# Patient Record
Sex: MALE | Race: WHITE | NOT HISPANIC OR LATINO
[De-identification: names, ages, dates, MRNs, and addresses within clinical notes are randomized per-mention and may not be internally consistent; named-entity substitution may affect disease eponyms.]

---

## 2020-07-30 PROBLEM — Z00.00 ENCOUNTER FOR PREVENTIVE HEALTH EXAMINATION: Status: ACTIVE | Noted: 2020-07-30

## 2022-08-09 ENCOUNTER — APPOINTMENT (OUTPATIENT)
Dept: ORTHOPEDIC SURGERY | Facility: CLINIC | Age: 57
End: 2022-08-09

## 2022-08-09 PROCEDURE — 73560 X-RAY EXAM OF KNEE 1 OR 2: CPT | Mod: 50

## 2022-08-09 PROCEDURE — 99213 OFFICE O/P EST LOW 20 MIN: CPT

## 2022-08-09 RX ORDER — MELOXICAM 7.5 MG/1
7.5 TABLET ORAL
Qty: 60 | Refills: 0 | Status: ACTIVE | COMMUNITY
Start: 2022-04-22

## 2022-08-09 RX ORDER — FAMOTIDINE 40 MG/1
40 TABLET, FILM COATED ORAL
Qty: 90 | Refills: 0 | Status: ACTIVE | COMMUNITY
Start: 2022-07-20

## 2022-08-09 RX ORDER — ALPRAZOLAM 1 MG/1
1 TABLET ORAL
Qty: 60 | Refills: 0 | Status: ACTIVE | COMMUNITY
Start: 2022-07-26

## 2022-08-09 RX ORDER — ZOLPIDEM TARTRATE 10 MG/1
10 TABLET ORAL
Qty: 30 | Refills: 0 | Status: ACTIVE | COMMUNITY
Start: 2022-06-14

## 2022-08-09 NOTE — HISTORY OF PRESENT ILLNESS
[de-identified] : History:\par f/u of b/l knee oa\par r worse than left\par prior injections did not help\par taking meloxicam\par \par Pain activity related, localized to the joint, partially improved with rest.\par ADL's noted to be increasingly difficult without use of modifying therapy.\par \par \par Medical History: Past Medical History is unchanged, all medical issues and medications are stable.\par \par Review of systems is negative for fevers, chills, chest pain, shortness of breath, unexplained weight fluctuations, GI or  symptoms.\par \par Smoking history and social habits are unchanged.\par \par \par \par Exam:\par b/l knee jlt, crepititioan, farom no contractures\par \par \par Imaging:\par Imaging:\par X-rays were taken in the office today.  \par AP and Lateral views were obtained of the knees.\par X-rays are negative for acute bone or soft tissue trauma.\par moderate OA noted b/l  right more severe than left\par \par Impression: b/l knee oa\par Plan is for non op management to include rest, activity modification, therapeutic exercises, stretching program, use of nsaids and analgesics, and weight control.\par \par \par Plan:\par

## 2022-08-24 ENCOUNTER — APPOINTMENT (OUTPATIENT)
Dept: PAIN MANAGEMENT | Facility: CLINIC | Age: 57
End: 2022-08-24

## 2022-08-24 VITALS — WEIGHT: 225 LBS | BODY MASS INDEX: 35.31 KG/M2 | HEIGHT: 67 IN

## 2022-08-24 LAB — BARBITURATES UR-MCNC: NORMAL

## 2022-08-24 PROCEDURE — 80305 DRUG TEST PRSMV DIR OPT OBS: CPT | Mod: QW

## 2022-08-24 PROCEDURE — 99213 OFFICE O/P EST LOW 20 MIN: CPT

## 2022-08-24 NOTE — PHYSICAL EXAM
[Normal Mood and Affect] : normal mood and affect [Orientated] : orientated [Able to Communicate] : able to communicate [Well Developed] : well developed [Well Nourished] : well nourished [] : diminished ROM in all planes [Flexion] : flexion

## 2022-08-24 NOTE — HISTORY OF PRESENT ILLNESS
[FreeTextEntry1] : HISTORY OF PRESENT ILLNESS, ORIGINAL PRESENTATION : This is a 56 year old man complaining of lower back pain. Patient underwent a triple fusion, it helped for the 1st year and then his pain came back. He started exercising however he made his pain worse. The patient has had this pain for 6 years. The pain started after injury at work, however that case has since closed. Patient describes pain as severe. During the last month the pain has been in no typical pattern. Pain described as sharp, pressure-like, shooting. Pain is associated with numbness and pins and needles into the lower extremity. Pain is increased with standing, walking, exercise, coughing/sneezing. Pain is decreased with lying down, relaxation. Pain is not changed with sitting, bowel movements. Bowel or bladder habits have not changed. \par \par PRIOR PAIN TREATMENTS: Moderate relief from surgery. No relief from injections, physical therapy, exercise, TENS unit, heat/cold treatment\par \par TODAY: I had the pleasure of speaking with Mr. Goode today in follow up. His previous history and physical findings have been reviewed.\par \par He is under our care for chronic lumbar pain with radiculopathy which he is receiving continuing active treatment for. He continues to remain remains stable on a regimen of Mobic 7.5 bid, gabapentin 400 mg, and Percocet 10/325 mg TID prn pain which provides him with 50% relief. He states the current regimen allows him to function and perform ADLs which he is pleased with. He wishes to continue as is without change and we are agreeable as well. He will undergo updated UDS testing today .

## 2022-08-24 NOTE — ASSESSMENT
[FreeTextEntry1] : 56 year old male with lumbar pain with radiculopathy. We will continue to prescribe Percocet, mobic and gabapentin as mentioned and he will f/u in 4 weeks for re evaluation. He is aware if there are any issues he can contact the office.\par \par I personally reviewed with the PA, this patient's history and physical exam findings, as documented above. I have discussed the relevant areas of concern, having direct implications to the presenting problems and illnesses, and I have personally examined all pertinent and positive and negative findings, which impact on the prior pain management treatment. \par \par \par Check of the  registry reveals compliance in regards to narcotic medication management use\par \par \par Urine drug screening collected today with rapid sample result consistent with given regimen. Sample to be sent for confirmatory testing.\par \par Varsha Otero, MS, PA-C\par Nehemiah Danielson, \par \par

## 2022-08-24 NOTE — REASON FOR VISIT
[Follow-Up Visit] : a follow-up pain management visit [FreeTextEntry2] : FOLLOW UP FOR CHRONIC LUMBAR PAIN WITH RADICULOPATHY

## 2022-10-20 ENCOUNTER — APPOINTMENT (OUTPATIENT)
Dept: PAIN MANAGEMENT | Facility: CLINIC | Age: 57
End: 2022-10-20

## 2022-10-20 VITALS
BODY MASS INDEX: 35.31 KG/M2 | WEIGHT: 225 LBS | HEIGHT: 67 IN | DIASTOLIC BLOOD PRESSURE: 75 MMHG | HEART RATE: 89 BPM | SYSTOLIC BLOOD PRESSURE: 138 MMHG

## 2022-10-20 PROCEDURE — 99213 OFFICE O/P EST LOW 20 MIN: CPT

## 2022-10-20 NOTE — ASSESSMENT
[FreeTextEntry1] : 56 year old male with lumbar pain with radiculopathy. We will continue to prescribe Percocet, mobic and gabapentin as mentioned and he will f/u in 4-8 weeks for reevaluation. He is aware if there are any issues he can contact the office.\par

## 2022-10-20 NOTE — DISCUSSION/SUMMARY
[de-identified] : I have consulted the  registry for the purpose of reviewing the patient's controlled substance.\par \par Overall there is at least a 30-50% reduction in pain with the prescribed analgesics. The patient denies any adverse side effects due to the medication (sleeping disturbance, constipation, sleepiness, hallucinations and/or urination problems). \par \par There are no inconsistencies on urine toxicology screening which would necessitate an alteration of therapy.\par \par The patient will return to the office in 4-8 weeks time and is aware to contact me with any question or concerns in the interim.\par \par Thelma Reid PA-C\par Nehemiah Danielson, \par

## 2022-10-20 NOTE — HISTORY OF PRESENT ILLNESS
[FreeTextEntry1] : HISTORY OF PRESENT ILLNESS, ORIGINAL PRESENTATION: This is a 56 year old man complaining of lower back pain. Patient underwent a triple fusion, it helped for the 1st year and then his pain came back. He started exercising however he made his pain worse. The patient has had this pain for 6 years. The pain started after injury at work, however that case has since closed. Patient describes pain as severe. During the last month the pain has been in no typical pattern. Pain described as sharp, pressure-like, shooting. Pain is associated with numbness and pins and needles into the lower extremity. Pain is increased with standing, walking, exercise, coughing/sneezing. Pain is decreased with lying down, relaxation. Pain is not changed with sitting, bowel movements. Bowel or bladder habits have not changed. \par \par PRIOR PAIN TREATMENTS: Moderate relief from surgery. No relief from injections, physical therapy, exercise, TENS unit, heat/cold treatment\par \par TODAY: He presents for a revisit appointment. He is under our care for chronic lumbar pain with radiculopathy. He continues to remain stable on a regimen of Mobic, gabapentin 400 mg, and Percocet 10/325 mg TID prn pain which provides him with 50% relief. He states the current regimen allows him to function and perform ADLs which he is pleased with. He wishes to continue as is without change and we are agreeable as well. UDS from August is consistent.

## 2022-11-01 ENCOUNTER — APPOINTMENT (OUTPATIENT)
Dept: ORTHOPEDIC SURGERY | Facility: CLINIC | Age: 57
End: 2022-11-01

## 2022-11-01 VITALS — WEIGHT: 232 LBS | BODY MASS INDEX: 36.41 KG/M2 | HEIGHT: 67 IN

## 2022-11-08 ENCOUNTER — APPOINTMENT (OUTPATIENT)
Dept: ORTHOPEDIC SURGERY | Facility: CLINIC | Age: 57
End: 2022-11-08

## 2022-11-08 PROCEDURE — 73502 X-RAY EXAM HIP UNI 2-3 VIEWS: CPT

## 2022-11-08 PROCEDURE — 99214 OFFICE O/P EST MOD 30 MIN: CPT

## 2022-11-08 NOTE — HISTORY OF PRESENT ILLNESS
[de-identified] : f/u of hips and knees\par right knee pain secondary to OA\par also right hip trochanteric bursitis\par \par has lost some wieght \par plan to move forward with a tka\par \par We discussed the surgery, including a description of the surgery in layman's terms, preoperative evaluation, the hospital stay, anesthesia, and expected outcome.  \par Models equivalent to the actual knee replacement prosthesis were used to demonstrate the magnitude and scope of the surgery.  Various modes of implant fixation including cement and biologic fixation were described.  The patient shared in the decision making and agreed to the use of implants.\par \par Additionally surgical risks were discussed. The patient has a good understanding of the inherent risks of surgery which include bleeding, pain, and infection, blood clots, and any medical issues that may arise in the perioperative period.  Additionally, we discussed risks uniquely pertinent to knee replacement surgery, including arthrofibrosis, instability, loosening, numbness around the incision, nerve injury, and possible need for revision surgery should the replacement not function optimally.  All questions were answered and the patient verbalized understanding.  I encouraged the patient to contact me should any further questions or issues arise.\par \par f/u at time of surgery

## 2022-11-15 ENCOUNTER — RX RENEWAL (OUTPATIENT)
Age: 57
End: 2022-11-15

## 2022-12-14 ENCOUNTER — RX RENEWAL (OUTPATIENT)
Age: 57
End: 2022-12-14

## 2022-12-16 ENCOUNTER — RX RENEWAL (OUTPATIENT)
Age: 57
End: 2022-12-16

## 2022-12-19 ENCOUNTER — LABORATORY RESULT (OUTPATIENT)
Age: 57
End: 2022-12-19

## 2022-12-20 ENCOUNTER — APPOINTMENT (OUTPATIENT)
Dept: PAIN MANAGEMENT | Facility: CLINIC | Age: 57
End: 2022-12-20

## 2022-12-20 VITALS
HEIGHT: 67 IN | WEIGHT: 232 LBS | BODY MASS INDEX: 36.41 KG/M2 | SYSTOLIC BLOOD PRESSURE: 140 MMHG | HEART RATE: 67 BPM | DIASTOLIC BLOOD PRESSURE: 89 MMHG

## 2022-12-20 LAB — PM COCAINE: NEGATIVE

## 2022-12-20 PROCEDURE — 99213 OFFICE O/P EST LOW 20 MIN: CPT

## 2022-12-20 PROCEDURE — 80305 DRUG TEST PRSMV DIR OPT OBS: CPT | Mod: QW

## 2022-12-21 NOTE — DISCUSSION/SUMMARY
[de-identified] : I have consulted the  registry for the purpose of reviewing the patient's controlled substance.\par \par Overall there is at least a 30-50% reduction in pain with the prescribed analgesics. The patient denies any adverse side effects due to the medication (sleeping disturbance, constipation, sleepiness, hallucinations and/or urination problems). \par \par Urine drug screening collected today with rapid sample result consistent with given regimen. Sample to be sent for confirmatory testing with additional relief at a later time.\par \par The patient will return to the office in 4-8 weeks time and is aware to contact me with any question or concerns in the interim.\par \par Thelma Reid PA-C\par Nehemiah Danielson DO\par

## 2022-12-21 NOTE — ASSESSMENT
[FreeTextEntry1] : 57 year old male with lumbar pain with radiculopathy. He also has right knee issues. He will be undergoing surgery with Dr. Sarabia in January. We will continue to prescribe Percocet, Mobic and Gabapentin as mentioned. He will call for his refills next month. I will see him back at the office in February.

## 2022-12-21 NOTE — PHYSICAL EXAM
[Normal Mood and Affect] : normal mood and affect [Orientated] : orientated [Able to Communicate] : able to communicate [Well Developed] : well developed [Well Nourished] : well nourished [Flexion] : flexion [] : positive sitting straight leg raise [TWNoteComboBox7] : forward flexion 30 degrees [de-identified] : extension 30 degrees [de-identified] : left lateral flexion 20 degrees [de-identified] : left lateral rotation 45 degrees [de-identified] : right lateral flexion 20 degrees [TWNoteComboBox6] : right lateral rotation 45 degrees

## 2022-12-21 NOTE — HISTORY OF PRESENT ILLNESS
[FreeTextEntry1] : HISTORY OF PRESENT ILLNESS, ORIGINAL PRESENTATION: This is a 57 year old man complaining of lower back pain. Patient underwent a triple fusion, it helped for the 1st year and then his pain came back. He started exercising however he made his pain worse. The patient has had this pain for 6 years. The pain started after injury at work, however that case has since closed. Patient describes pain as severe. During the last month the pain has been in no typical pattern. Pain described as sharp, pressure-like, shooting. Pain is associated with numbness and pins and needles into the lower extremity. Pain is increased with standing, walking, exercise, coughing/sneezing. Pain is decreased with lying down, relaxation. Pain is not changed with sitting, bowel movements. Bowel or bladder habits have not changed. \par \par PRIOR PAIN TREATMENTS: Moderate relief from surgery. No relief from injections, physical therapy, exercise, TENS unit, heat/cold treatment\par \par TODAY: He presents for a revisit appointment. Last seen on 10/20/2022 and today reports that since then there has been no new complaints or acute changes to his condition. He is under our care for chronic lumbar pain with radiculopathy. He has issues with his right knee and is scheduled for a right knee replacement on 01/23/2023 with Dr. Sarabia.\par \par He continues to remain stable on a regimen of Mobic, gabapentin 400 mg, and Percocet 10/325 mg TID which provides him with 50% relief. He states the current regimen allows him to function and perform ADLs which he is pleased with. He wishes to continue as is without change and we are agreeable as well. \par \par UDS, repeated today, 12/20/22 - see separate note.

## 2023-01-03 ENCOUNTER — RESULT REVIEW (OUTPATIENT)
Age: 58
End: 2023-01-03

## 2023-01-03 ENCOUNTER — OUTPATIENT (OUTPATIENT)
Dept: OUTPATIENT SERVICES | Facility: HOSPITAL | Age: 58
LOS: 1 days | Discharge: HOME | End: 2023-01-03
Payer: MEDICARE

## 2023-01-03 ENCOUNTER — TRANSCRIPTION ENCOUNTER (OUTPATIENT)
Age: 58
End: 2023-01-03

## 2023-01-03 VITALS
TEMPERATURE: 98 F | SYSTOLIC BLOOD PRESSURE: 164 MMHG | RESPIRATION RATE: 18 BRPM | HEART RATE: 67 BPM | WEIGHT: 234.35 LBS | HEIGHT: 67 IN | OXYGEN SATURATION: 100 % | DIASTOLIC BLOOD PRESSURE: 83 MMHG

## 2023-01-03 DIAGNOSIS — M17.11 UNILATERAL PRIMARY OSTEOARTHRITIS, RIGHT KNEE: ICD-10-CM

## 2023-01-03 DIAGNOSIS — Z01.818 ENCOUNTER FOR OTHER PREPROCEDURAL EXAMINATION: ICD-10-CM

## 2023-01-03 DIAGNOSIS — Z98.890 OTHER SPECIFIED POSTPROCEDURAL STATES: Chronic | ICD-10-CM

## 2023-01-03 LAB
A1C WITH ESTIMATED AVERAGE GLUCOSE RESULT: 6 % — HIGH (ref 4–5.6)
ALBUMIN SERPL ELPH-MCNC: 4.2 G/DL — SIGNIFICANT CHANGE UP (ref 3.5–5.2)
ALP SERPL-CCNC: 83 U/L — SIGNIFICANT CHANGE UP (ref 30–115)
ALT FLD-CCNC: 22 U/L — SIGNIFICANT CHANGE UP (ref 0–41)
ANION GAP SERPL CALC-SCNC: 11 MMOL/L — SIGNIFICANT CHANGE UP (ref 7–14)
APTT BLD: 30.1 SEC — SIGNIFICANT CHANGE UP (ref 27–39.2)
AST SERPL-CCNC: 22 U/L — SIGNIFICANT CHANGE UP (ref 0–41)
BASOPHILS # BLD AUTO: 0.07 K/UL — SIGNIFICANT CHANGE UP (ref 0–0.2)
BASOPHILS NFR BLD AUTO: 1.1 % — HIGH (ref 0–1)
BILIRUB SERPL-MCNC: 0.8 MG/DL — SIGNIFICANT CHANGE UP (ref 0.2–1.2)
BLD GP AB SCN SERPL QL: SIGNIFICANT CHANGE UP
BUN SERPL-MCNC: 23 MG/DL — HIGH (ref 10–20)
CALCIUM SERPL-MCNC: 9.1 MG/DL — SIGNIFICANT CHANGE UP (ref 8.4–10.5)
CHLORIDE SERPL-SCNC: 104 MMOL/L — SIGNIFICANT CHANGE UP (ref 98–110)
CO2 SERPL-SCNC: 24 MMOL/L — SIGNIFICANT CHANGE UP (ref 17–32)
CREAT SERPL-MCNC: 0.9 MG/DL — SIGNIFICANT CHANGE UP (ref 0.7–1.5)
EGFR: 98 ML/MIN/1.73M2 — SIGNIFICANT CHANGE UP
EOSINOPHIL # BLD AUTO: 0.19 K/UL — SIGNIFICANT CHANGE UP (ref 0–0.7)
EOSINOPHIL NFR BLD AUTO: 3 % — SIGNIFICANT CHANGE UP (ref 0–8)
ESTIMATED AVERAGE GLUCOSE: 126 MG/DL — HIGH (ref 68–114)
GLUCOSE SERPL-MCNC: 106 MG/DL — HIGH (ref 70–99)
HCT VFR BLD CALC: 41.2 % — LOW (ref 42–52)
HGB BLD-MCNC: 13.5 G/DL — LOW (ref 14–18)
IMM GRANULOCYTES NFR BLD AUTO: 0.3 % — SIGNIFICANT CHANGE UP (ref 0.1–0.3)
INR BLD: 1 RATIO — SIGNIFICANT CHANGE UP (ref 0.65–1.3)
LYMPHOCYTES # BLD AUTO: 1.74 K/UL — SIGNIFICANT CHANGE UP (ref 1.2–3.4)
LYMPHOCYTES # BLD AUTO: 27.3 % — SIGNIFICANT CHANGE UP (ref 20.5–51.1)
MCHC RBC-ENTMCNC: 28.5 PG — SIGNIFICANT CHANGE UP (ref 27–31)
MCHC RBC-ENTMCNC: 32.8 G/DL — SIGNIFICANT CHANGE UP (ref 32–37)
MCV RBC AUTO: 86.9 FL — SIGNIFICANT CHANGE UP (ref 80–94)
MONOCYTES # BLD AUTO: 0.51 K/UL — SIGNIFICANT CHANGE UP (ref 0.1–0.6)
MONOCYTES NFR BLD AUTO: 8 % — SIGNIFICANT CHANGE UP (ref 1.7–9.3)
MRSA PCR RESULT.: NEGATIVE — SIGNIFICANT CHANGE UP
NEUTROPHILS # BLD AUTO: 3.85 K/UL — SIGNIFICANT CHANGE UP (ref 1.4–6.5)
NEUTROPHILS NFR BLD AUTO: 60.3 % — SIGNIFICANT CHANGE UP (ref 42.2–75.2)
NRBC # BLD: 0 /100 WBCS — SIGNIFICANT CHANGE UP (ref 0–0)
PLATELET # BLD AUTO: 185 K/UL — SIGNIFICANT CHANGE UP (ref 130–400)
POTASSIUM SERPL-MCNC: 4.5 MMOL/L — SIGNIFICANT CHANGE UP (ref 3.5–5)
POTASSIUM SERPL-SCNC: 4.5 MMOL/L — SIGNIFICANT CHANGE UP (ref 3.5–5)
PROT SERPL-MCNC: 6.4 G/DL — SIGNIFICANT CHANGE UP (ref 6–8)
PROTHROM AB SERPL-ACNC: 11.4 SEC — SIGNIFICANT CHANGE UP (ref 9.95–12.87)
RBC # BLD: 4.74 M/UL — SIGNIFICANT CHANGE UP (ref 4.7–6.1)
RBC # FLD: 13.2 % — SIGNIFICANT CHANGE UP (ref 11.5–14.5)
SODIUM SERPL-SCNC: 139 MMOL/L — SIGNIFICANT CHANGE UP (ref 135–146)
WBC # BLD: 6.38 K/UL — SIGNIFICANT CHANGE UP (ref 4.8–10.8)
WBC # FLD AUTO: 6.38 K/UL — SIGNIFICANT CHANGE UP (ref 4.8–10.8)

## 2023-01-03 PROCEDURE — 93010 ELECTROCARDIOGRAM REPORT: CPT

## 2023-01-03 PROCEDURE — 73562 X-RAY EXAM OF KNEE 3: CPT | Mod: 26,RT

## 2023-01-03 PROCEDURE — 71046 X-RAY EXAM CHEST 2 VIEWS: CPT | Mod: 26

## 2023-01-03 PROCEDURE — 72170 X-RAY EXAM OF PELVIS: CPT | Mod: 26

## 2023-01-03 NOTE — H&P PST ADULT - HISTORY OF PRESENT ILLNESS
Patient is a 57 year old male presenting to PAST in preparation for Right Total Knee Replacement on 1/23 under [facility]  anesthesia by Dr. Sarabia .  Reports pain to right knee for aprox  1 year. Rates pain 9/10 has had conservative tx without positve response was advised to have above  PATIENT CURRENTLY DENIES CHEST PAIN  SHORTNESS OF BREATH  PALPITATIONS,  DYSURIA, OR UPPER RESPIRATORY INFECTION IN PAST 2 WEEKS  EXERCISE  TOLERANCE  1-2 FLIGHT OF STAIRS  WITHOUT SHORTNESS OF BREATH    Anesthesia Alert  NO--Difficult Airway  NO--History of neck surgery or radiation  NO--Limited ROM of neck  NO--History of Malignant hyperthermia  NO--Personal or family history of Pseudocholinesterase deficiency  NO--Prior Anesthesia Complication  NO--Latex Allergy  NO--Loose teeth  NO--History of Rheumatoid Arthritis  NO--WOODY  NO-- BLEEDING RISK  NO--Other_____    As per patient, this is their complete medical and surgical history, including medications both prescribed or over the counter.  Patient verbalized understanding of instructions and was given the opportunity to ask questions and have them answered.  Patient denies any signs or symptoms of COVID 19 and denies contact with known positive individuals.  They have an appointment for COVID testing pre-procedure and acknowledge its time and place.  They were instructed to quarantine pre-procedure, practice exposure control measures, continue to self-monitor and report any concerns to their proceduralist.

## 2023-01-10 ENCOUNTER — RX RENEWAL (OUTPATIENT)
Age: 58
End: 2023-01-10

## 2023-01-10 ENCOUNTER — APPOINTMENT (OUTPATIENT)
Dept: ORTHOPEDIC SURGERY | Facility: CLINIC | Age: 58
End: 2023-01-10
Payer: MEDICARE

## 2023-01-10 PROCEDURE — 99213 OFFICE O/P EST LOW 20 MIN: CPT

## 2023-01-10 NOTE — HISTORY OF PRESENT ILLNESS
[de-identified] : 57-year-old man for follow-up of the right knee.\par \par End-stage arthritis noted on prior knee x-rays.\par Patient has failed a course of nonoperative management including rest, activity modification, weight control, stretching program, therapeutic exercises, anti-inflammatory medication and angalgesics.\par \par He has lost about 20 pounds.\par \par Exam shows joint line tenderness, varus alignment crepitation with range of motion of the knee.\par \par Plan is for a right total knee replacement.\par \par Risks and benefits were once again discussed.  All questions were answered to his satisfaction.

## 2023-01-17 RX ORDER — OXYCODONE AND ACETAMINOPHEN 10; 325 MG/1; MG/1
10-325 TABLET ORAL 3 TIMES DAILY
Qty: 90 | Refills: 0 | Status: ACTIVE | COMMUNITY
Start: 2022-07-20 | End: 1900-01-01

## 2023-01-20 ENCOUNTER — LABORATORY RESULT (OUTPATIENT)
Age: 58
End: 2023-01-20

## 2023-01-22 ENCOUNTER — FORM ENCOUNTER (OUTPATIENT)
Age: 58
End: 2023-01-22

## 2023-01-23 ENCOUNTER — RESULT REVIEW (OUTPATIENT)
Age: 58
End: 2023-01-23

## 2023-01-23 ENCOUNTER — INPATIENT (INPATIENT)
Facility: HOSPITAL | Age: 58
LOS: 0 days | Discharge: HOME HEALTH | End: 2023-01-24
Attending: ORTHOPAEDIC SURGERY | Admitting: ORTHOPAEDIC SURGERY
Payer: MEDICARE

## 2023-01-23 ENCOUNTER — APPOINTMENT (OUTPATIENT)
Dept: ORTHOPEDIC SURGERY | Facility: HOSPITAL | Age: 58
End: 2023-01-23
Payer: MEDICARE

## 2023-01-23 VITALS
TEMPERATURE: 97 F | OXYGEN SATURATION: 99 % | HEIGHT: 67 IN | RESPIRATION RATE: 17 BRPM | WEIGHT: 233.91 LBS | SYSTOLIC BLOOD PRESSURE: 118 MMHG | DIASTOLIC BLOOD PRESSURE: 67 MMHG | HEART RATE: 57 BPM

## 2023-01-23 DIAGNOSIS — Z98.890 OTHER SPECIFIED POSTPROCEDURAL STATES: Chronic | ICD-10-CM

## 2023-01-23 LAB — GLUCOSE BLDC GLUCOMTR-MCNC: 96 MG/DL — SIGNIFICANT CHANGE UP (ref 70–99)

## 2023-01-23 PROCEDURE — 88311 DECALCIFY TISSUE: CPT | Mod: 26

## 2023-01-23 PROCEDURE — 88305 TISSUE EXAM BY PATHOLOGIST: CPT | Mod: 26

## 2023-01-23 PROCEDURE — 73560 X-RAY EXAM OF KNEE 1 OR 2: CPT | Mod: 26,RT

## 2023-01-23 PROCEDURE — 27447 TOTAL KNEE ARTHROPLASTY: CPT | Mod: RT

## 2023-01-23 RX ORDER — SODIUM CHLORIDE 9 MG/ML
1000 INJECTION INTRAMUSCULAR; INTRAVENOUS; SUBCUTANEOUS
Refills: 0 | Status: DISCONTINUED | OUTPATIENT
Start: 2023-01-23 | End: 2023-01-24

## 2023-01-23 RX ORDER — TOBRAMYCIN 0.3 %
1 DROPS OPHTHALMIC (EYE) THREE TIMES A DAY
Refills: 0 | Status: DISCONTINUED | OUTPATIENT
Start: 2023-01-23 | End: 2023-01-23

## 2023-01-23 RX ORDER — OXYCODONE HYDROCHLORIDE 5 MG/1
5 TABLET ORAL EVERY 8 HOURS
Refills: 0 | Status: DISCONTINUED | OUTPATIENT
Start: 2023-01-23 | End: 2023-01-24

## 2023-01-23 RX ORDER — POLYETHYLENE GLYCOL 3350 17 G/17G
17 POWDER, FOR SOLUTION ORAL AT BEDTIME
Refills: 0 | Status: DISCONTINUED | OUTPATIENT
Start: 2023-01-23 | End: 2023-01-24

## 2023-01-23 RX ORDER — ACETAMINOPHEN 500 MG
1000 TABLET ORAL ONCE
Refills: 0 | Status: COMPLETED | OUTPATIENT
Start: 2023-01-23 | End: 2023-01-23

## 2023-01-23 RX ORDER — PANTOPRAZOLE SODIUM 20 MG/1
40 TABLET, DELAYED RELEASE ORAL
Refills: 0 | Status: DISCONTINUED | OUTPATIENT
Start: 2023-01-23 | End: 2023-01-23

## 2023-01-23 RX ORDER — HYDROMORPHONE HYDROCHLORIDE 2 MG/ML
0.5 INJECTION INTRAMUSCULAR; INTRAVENOUS; SUBCUTANEOUS
Refills: 0 | Status: DISCONTINUED | OUTPATIENT
Start: 2023-01-23 | End: 2023-01-23

## 2023-01-23 RX ORDER — ONDANSETRON 8 MG/1
4 TABLET, FILM COATED ORAL EVERY 6 HOURS
Refills: 0 | Status: DISCONTINUED | OUTPATIENT
Start: 2023-01-23 | End: 2023-01-24

## 2023-01-23 RX ORDER — ZOLPIDEM TARTRATE 10 MG/1
5 TABLET ORAL AT BEDTIME
Refills: 0 | Status: DISCONTINUED | OUTPATIENT
Start: 2023-01-23 | End: 2023-01-24

## 2023-01-23 RX ORDER — FAMOTIDINE 10 MG/ML
40 INJECTION INTRAVENOUS DAILY
Refills: 0 | Status: DISCONTINUED | OUTPATIENT
Start: 2023-01-23 | End: 2023-01-24

## 2023-01-23 RX ORDER — SENNA PLUS 8.6 MG/1
2 TABLET ORAL AT BEDTIME
Refills: 0 | Status: DISCONTINUED | OUTPATIENT
Start: 2023-01-23 | End: 2023-01-24

## 2023-01-23 RX ORDER — CHLORHEXIDINE GLUCONATE 213 G/1000ML
1 SOLUTION TOPICAL
Refills: 0 | Status: DISCONTINUED | OUTPATIENT
Start: 2023-01-23 | End: 2023-01-24

## 2023-01-23 RX ORDER — SODIUM CHLORIDE 9 MG/ML
1000 INJECTION, SOLUTION INTRAVENOUS
Refills: 0 | Status: DISCONTINUED | OUTPATIENT
Start: 2023-01-23 | End: 2023-01-23

## 2023-01-23 RX ORDER — CIPROFLOXACIN HCL 0.3 %
1 DROPS OPHTHALMIC (EYE)
Refills: 0 | Status: COMPLETED | OUTPATIENT
Start: 2023-01-23 | End: 2023-01-24

## 2023-01-23 RX ORDER — ASPIRIN/CALCIUM CARB/MAGNESIUM 324 MG
81 TABLET ORAL
Refills: 0 | Status: DISCONTINUED | OUTPATIENT
Start: 2023-01-23 | End: 2023-01-24

## 2023-01-23 RX ORDER — TRAMADOL HYDROCHLORIDE 50 MG/1
50 TABLET ORAL EVERY 4 HOURS
Refills: 0 | Status: DISCONTINUED | OUTPATIENT
Start: 2023-01-23 | End: 2023-01-24

## 2023-01-23 RX ORDER — OXYCODONE AND ACETAMINOPHEN 5; 325 MG/1; MG/1
1 TABLET ORAL
Qty: 0 | Refills: 0 | DISCHARGE

## 2023-01-23 RX ORDER — INFLUENZA VIRUS VACCINE 15; 15; 15; 15 UG/.5ML; UG/.5ML; UG/.5ML; UG/.5ML
0.5 SUSPENSION INTRAMUSCULAR ONCE
Refills: 0 | Status: DISCONTINUED | OUTPATIENT
Start: 2023-01-23 | End: 2023-01-24

## 2023-01-23 RX ORDER — MAGNESIUM HYDROXIDE 400 MG/1
30 TABLET, CHEWABLE ORAL DAILY
Refills: 0 | Status: DISCONTINUED | OUTPATIENT
Start: 2023-01-23 | End: 2023-01-24

## 2023-01-23 RX ORDER — KETOROLAC TROMETHAMINE 30 MG/ML
15 SYRINGE (ML) INJECTION EVERY 6 HOURS
Refills: 0 | Status: DISCONTINUED | OUTPATIENT
Start: 2023-01-23 | End: 2023-01-24

## 2023-01-23 RX ORDER — GABAPENTIN 400 MG/1
1 CAPSULE ORAL
Qty: 0 | Refills: 0 | DISCHARGE

## 2023-01-23 RX ORDER — MELOXICAM 15 MG/1
1 TABLET ORAL
Qty: 0 | Refills: 0 | DISCHARGE

## 2023-01-23 RX ORDER — ONDANSETRON 8 MG/1
4 TABLET, FILM COATED ORAL ONCE
Refills: 0 | Status: DISCONTINUED | OUTPATIENT
Start: 2023-01-23 | End: 2023-01-23

## 2023-01-23 RX ORDER — CELECOXIB 200 MG/1
400 CAPSULE ORAL ONCE
Refills: 0 | Status: COMPLETED | OUTPATIENT
Start: 2023-01-23 | End: 2023-01-23

## 2023-01-23 RX ORDER — GABAPENTIN 400 MG/1
400 CAPSULE ORAL THREE TIMES A DAY
Refills: 0 | Status: DISCONTINUED | OUTPATIENT
Start: 2023-01-23 | End: 2023-01-24

## 2023-01-23 RX ORDER — CEFAZOLIN SODIUM 1 G
2000 VIAL (EA) INJECTION EVERY 8 HOURS
Refills: 0 | Status: COMPLETED | OUTPATIENT
Start: 2023-01-23 | End: 2023-01-24

## 2023-01-23 RX ORDER — ALPRAZOLAM 0.25 MG
1 TABLET ORAL
Refills: 0 | Status: DISCONTINUED | OUTPATIENT
Start: 2023-01-23 | End: 2023-01-24

## 2023-01-23 RX ORDER — ACETAMINOPHEN 500 MG
650 TABLET ORAL EVERY 6 HOURS
Refills: 0 | Status: DISCONTINUED | OUTPATIENT
Start: 2023-01-23 | End: 2023-01-24

## 2023-01-23 RX ORDER — FAMOTIDINE 10 MG/ML
1 INJECTION INTRAVENOUS
Qty: 0 | Refills: 0 | DISCHARGE

## 2023-01-23 RX ORDER — ALPRAZOLAM 0.25 MG
1 TABLET ORAL
Qty: 0 | Refills: 0 | DISCHARGE

## 2023-01-23 RX ORDER — ZOLPIDEM TARTRATE 10 MG/1
1 TABLET ORAL
Qty: 0 | Refills: 0 | DISCHARGE

## 2023-01-23 RX ORDER — CELECOXIB 200 MG/1
200 CAPSULE ORAL EVERY 12 HOURS
Refills: 0 | Status: DISCONTINUED | OUTPATIENT
Start: 2023-01-24 | End: 2023-01-24

## 2023-01-23 RX ADMIN — POLYETHYLENE GLYCOL 3350 17 GRAM(S): 17 POWDER, FOR SOLUTION ORAL at 21:12

## 2023-01-23 RX ADMIN — SODIUM CHLORIDE 125 MILLILITER(S): 9 INJECTION, SOLUTION INTRAVENOUS at 13:56

## 2023-01-23 RX ADMIN — Medication 15 MILLIGRAM(S): at 14:07

## 2023-01-23 RX ADMIN — Medication 100 MILLIGRAM(S): at 18:18

## 2023-01-23 RX ADMIN — Medication 81 MILLIGRAM(S): at 17:00

## 2023-01-23 RX ADMIN — SODIUM CHLORIDE 75 MILLILITER(S): 9 INJECTION INTRAMUSCULAR; INTRAVENOUS; SUBCUTANEOUS at 17:03

## 2023-01-23 RX ADMIN — ZOLPIDEM TARTRATE 5 MILLIGRAM(S): 10 TABLET ORAL at 22:28

## 2023-01-23 RX ADMIN — TRAMADOL HYDROCHLORIDE 50 MILLIGRAM(S): 50 TABLET ORAL at 21:12

## 2023-01-23 RX ADMIN — CELECOXIB 400 MILLIGRAM(S): 200 CAPSULE ORAL at 08:26

## 2023-01-23 RX ADMIN — ZOLPIDEM TARTRATE 5 MILLIGRAM(S): 10 TABLET ORAL at 21:53

## 2023-01-23 RX ADMIN — Medication 15 MILLIGRAM(S): at 13:57

## 2023-01-23 RX ADMIN — Medication 15 MILLIGRAM(S): at 20:24

## 2023-01-23 RX ADMIN — Medication 1 DROP(S): at 13:58

## 2023-01-23 RX ADMIN — Medication 650 MILLIGRAM(S): at 17:00

## 2023-01-23 RX ADMIN — OXYCODONE HYDROCHLORIDE 5 MILLIGRAM(S): 5 TABLET ORAL at 17:01

## 2023-01-23 RX ADMIN — SENNA PLUS 2 TABLET(S): 8.6 TABLET ORAL at 21:12

## 2023-01-23 RX ADMIN — GABAPENTIN 400 MILLIGRAM(S): 400 CAPSULE ORAL at 13:57

## 2023-01-23 RX ADMIN — HYDROMORPHONE HYDROCHLORIDE 0.5 MILLIGRAM(S): 2 INJECTION INTRAMUSCULAR; INTRAVENOUS; SUBCUTANEOUS at 13:12

## 2023-01-23 RX ADMIN — Medication 650 MILLIGRAM(S): at 23:05

## 2023-01-23 RX ADMIN — Medication 1 DROP(S): at 12:59

## 2023-01-23 RX ADMIN — GABAPENTIN 400 MILLIGRAM(S): 400 CAPSULE ORAL at 21:12

## 2023-01-23 RX ADMIN — Medication 1 MILLIGRAM(S): at 21:12

## 2023-01-23 NOTE — ASU PATIENT PROFILE, ADULT - NSICDXPASTSURGICALHX_GEN_ALL_CORE_FT
PAST SURGICAL HISTORY:  H/O hand surgery b/l carpal tunnel surgery    Previous back surgery srews

## 2023-01-23 NOTE — PHYSICAL THERAPY INITIAL EVALUATION ADULT - GENERAL OBSERVATIONS, REHAB EVAL
16:40-17:05. Chart reviewed; confirmed with RN to see the pt for PT. Pt ready for PT; received in chair with complain of pain in R knee. +hep lock L UE, +ace bandage R knee. Agreeable for PT evaluation.

## 2023-01-23 NOTE — CHART NOTE - NSCHARTNOTEFT_GEN_A_CORE
PACU ANESTHESIA ADMISSION NOTE      Procedure:   Post op diagnosis:      ____  Intubated  TV:______       Rate: ______      FiO2: ______    __x_  Patent Airway    __x__  Full return of protective reflexes    ____  Full recovery from anesthesia / back to baseline     Vitals:   T:  98.3         R: 18                 BP:   143/75               Sat:   98                P: 64      Mental Status:  __x__ Awake   __x___ Alert   _____ Drowsy   _____ Sedated    Nausea/Vomiting:  _x___ NO  ______Yes,   See Post - Op Orders          Pain Scale (0-10):  __0___    Treatment: ____ None    ____ See Post - Op/PCA Orders    Post - Operative Fluids:   ____ Oral   __x__ See Post - Op Orders    Plan: Discharge:   ____Home       __x___Floor     _____Critical Care    _____  Other:_________________    Comments: d/c to floor when meets criteria

## 2023-01-23 NOTE — ASU PATIENT PROFILE, ADULT - NSICDXPASTMEDICALHX_GEN_ALL_CORE_FT
PAST MEDICAL HISTORY:  Anxiety     Back pain     Former smoker 10-12 years    H/O insomnia     Heart murmur     Obese

## 2023-01-24 ENCOUNTER — FORM ENCOUNTER (OUTPATIENT)
Age: 58
End: 2023-01-24

## 2023-01-24 ENCOUNTER — TRANSCRIPTION ENCOUNTER (OUTPATIENT)
Age: 58
End: 2023-01-24

## 2023-01-24 VITALS
DIASTOLIC BLOOD PRESSURE: 77 MMHG | TEMPERATURE: 97 F | RESPIRATION RATE: 16 BRPM | OXYGEN SATURATION: 100 % | SYSTOLIC BLOOD PRESSURE: 133 MMHG | HEART RATE: 72 BPM

## 2023-01-24 LAB
ANION GAP SERPL CALC-SCNC: 11 MMOL/L — SIGNIFICANT CHANGE UP (ref 7–14)
BUN SERPL-MCNC: 19 MG/DL — SIGNIFICANT CHANGE UP (ref 10–20)
CALCIUM SERPL-MCNC: 8.6 MG/DL — SIGNIFICANT CHANGE UP (ref 8.4–10.5)
CHLORIDE SERPL-SCNC: 101 MMOL/L — SIGNIFICANT CHANGE UP (ref 98–110)
CO2 SERPL-SCNC: 25 MMOL/L — SIGNIFICANT CHANGE UP (ref 17–32)
CREAT SERPL-MCNC: 0.8 MG/DL — SIGNIFICANT CHANGE UP (ref 0.7–1.5)
EGFR: 102 ML/MIN/1.73M2 — SIGNIFICANT CHANGE UP
GLUCOSE SERPL-MCNC: 123 MG/DL — HIGH (ref 70–99)
HCT VFR BLD CALC: 38 % — LOW (ref 42–52)
HGB BLD-MCNC: 12.9 G/DL — LOW (ref 14–18)
MCHC RBC-ENTMCNC: 28.9 PG — SIGNIFICANT CHANGE UP (ref 27–31)
MCHC RBC-ENTMCNC: 33.9 G/DL — SIGNIFICANT CHANGE UP (ref 32–37)
MCV RBC AUTO: 85 FL — SIGNIFICANT CHANGE UP (ref 80–94)
NRBC # BLD: 0 /100 WBCS — SIGNIFICANT CHANGE UP (ref 0–0)
PLATELET # BLD AUTO: 205 K/UL — SIGNIFICANT CHANGE UP (ref 130–400)
POTASSIUM SERPL-MCNC: 4.1 MMOL/L — SIGNIFICANT CHANGE UP (ref 3.5–5)
POTASSIUM SERPL-SCNC: 4.1 MMOL/L — SIGNIFICANT CHANGE UP (ref 3.5–5)
RBC # BLD: 4.47 M/UL — LOW (ref 4.7–6.1)
RBC # FLD: 13.1 % — SIGNIFICANT CHANGE UP (ref 11.5–14.5)
SODIUM SERPL-SCNC: 137 MMOL/L — SIGNIFICANT CHANGE UP (ref 135–146)
WBC # BLD: 13.31 K/UL — HIGH (ref 4.8–10.8)
WBC # FLD AUTO: 13.31 K/UL — HIGH (ref 4.8–10.8)

## 2023-01-24 PROCEDURE — 99232 SBSQ HOSP IP/OBS MODERATE 35: CPT

## 2023-01-24 RX ORDER — POLYETHYLENE GLYCOL 3350 17 G/17G
17 POWDER, FOR SOLUTION ORAL
Qty: 0 | Refills: 0 | DISCHARGE
Start: 2023-01-24

## 2023-01-24 RX ORDER — ACETAMINOPHEN 500 MG
2 TABLET ORAL
Qty: 0 | Refills: 0 | DISCHARGE
Start: 2023-01-24

## 2023-01-24 RX ORDER — ASPIRIN/CALCIUM CARB/MAGNESIUM 324 MG
1 TABLET ORAL
Qty: 60 | Refills: 0
Start: 2023-01-24 | End: 2023-02-22

## 2023-01-24 RX ORDER — SENNA PLUS 8.6 MG/1
2 TABLET ORAL
Qty: 0 | Refills: 0 | DISCHARGE
Start: 2023-01-24

## 2023-01-24 RX ADMIN — OXYCODONE HYDROCHLORIDE 5 MILLIGRAM(S): 5 TABLET ORAL at 01:49

## 2023-01-24 RX ADMIN — Medication 15 MILLIGRAM(S): at 07:22

## 2023-01-24 RX ADMIN — Medication 1 MILLIGRAM(S): at 05:26

## 2023-01-24 RX ADMIN — GABAPENTIN 400 MILLIGRAM(S): 400 CAPSULE ORAL at 05:26

## 2023-01-24 RX ADMIN — Medication 15 MILLIGRAM(S): at 01:44

## 2023-01-24 RX ADMIN — Medication 650 MILLIGRAM(S): at 05:27

## 2023-01-24 RX ADMIN — TRAMADOL HYDROCHLORIDE 50 MILLIGRAM(S): 50 TABLET ORAL at 02:49

## 2023-01-24 RX ADMIN — OXYCODONE HYDROCHLORIDE 5 MILLIGRAM(S): 5 TABLET ORAL at 09:31

## 2023-01-24 RX ADMIN — SODIUM CHLORIDE 75 MILLILITER(S): 9 INJECTION INTRAMUSCULAR; INTRAVENOUS; SUBCUTANEOUS at 08:01

## 2023-01-24 RX ADMIN — Medication 1 DROP(S): at 01:43

## 2023-01-24 RX ADMIN — Medication 81 MILLIGRAM(S): at 05:26

## 2023-01-24 RX ADMIN — Medication 100 MILLIGRAM(S): at 01:44

## 2023-01-24 RX ADMIN — Medication 15 MILLIGRAM(S): at 08:16

## 2023-01-24 RX ADMIN — CHLORHEXIDINE GLUCONATE 1 APPLICATION(S): 213 SOLUTION TOPICAL at 05:26

## 2023-01-24 NOTE — DISCHARGE NOTE NURSING/CASE MANAGEMENT/SOCIAL WORK - PATIENT PORTAL LINK FT
You can access the FollowMyHealth Patient Portal offered by [facility] by registering at the following website: [email or web address]. By joining [facility]’s FollowMyHealth portal, you will also be able to view your health information using other applications (apps) compatible with our system.

## 2023-01-24 NOTE — PROGRESS NOTE ADULT - SUBJECTIVE AND OBJECTIVE BOX
57y Male POD #  1   S/P right Total Knee Arthroplasty     Patient seen and examined at bedside . The patient is awake and alert in NAD. No complaints of chest pain, SOB, N/V.  Pain is controlled, the patient has ambulated and is voiding. All questions were answered. c/o left eye pain- anesthesia rx for corneal abrasion.    PAST MEDICAL & SURGICAL HISTORY:  Former smoker  10-12 years    Back pain    Anxiety    H/O insomnia    Heart murmur    Obesity (BMI 30-39.9)    Previous back surgery  srews    H/O hand surgery  b/l carpal tunnel surgery          MEDICATIONS  (STANDING):  acetaminophen     Tablet .. 650 milliGRAM(s) Oral every 6 hours  ALPRAZolam 1 milliGRAM(s) Oral two times a day  aspirin enteric coated 81 milliGRAM(s) Oral two times a day  celecoxib 200 milliGRAM(s) Oral every 12 hours  chlorhexidine 2% Cloths 1 Application(s) Topical <User Schedule>  famotidine    Tablet 40 milliGRAM(s) Oral daily  gabapentin 400 milliGRAM(s) Oral three times a day  influenza   Vaccine 0.5 milliLiter(s) IntraMuscular once  multivitamin 1 Tablet(s) Oral daily  polyethylene glycol 3350 17 Gram(s) Oral at bedtime  senna 2 Tablet(s) Oral at bedtime  sodium chloride 0.9%. 1000 milliLiter(s) (75 mL/Hr) IV Continuous <Continuous>  tetracaine 0.5% Solution 1 Drop(s) Left EYE once    MEDICATIONS  (PRN):  magnesium hydroxide Suspension 30 milliLiter(s) Oral daily PRN Constipation  ondansetron Injectable 4 milliGRAM(s) IV Push every 6 hours PRN Nausea and/or Vomiting  oxyCODONE    IR 5 milliGRAM(s) Oral every 8 hours PRN breakthrough pain  traMADol 50 milliGRAM(s) Oral every 4 hours PRN Severe Pain (7 - 10)  zolpidem 5 milliGRAM(s) Oral at bedtime PRN Insomnia  zolpidem 5 milliGRAM(s) Oral at bedtime PRN Insomnia        Vital Signs Last 24 Hrs  T(C): 36.3 (24 Jan 2023 00:50), Max: 36.8 (23 Jan 2023 12:09)  T(F): 97.4 (24 Jan 2023 00:50), Max: 98.3 (23 Jan 2023 12:09)  HR: 70 (24 Jan 2023 00:50) (53 - 70)  BP: 129/64 (24 Jan 2023 00:50) (118/67 - 157/72)  BP(mean): --  RR: 16 (24 Jan 2023 00:50) (12 - 19)  SpO2: 98% (24 Jan 2023 00:50) (97% - 100%)                          12.9   13.31 )-----------( 205      ( 24 Jan 2023 07:08 )             38.0     01-24    137  |  101  |  19  ----------------------------<  123<H>  4.1   |  25  |  0.8    Ca    8.6      24 Jan 2023 07:08                PE:  The patient was seen and examined at bedside          A&OX3, NAD          right knee Aquacel  dressing C/D/I          Compartments soft, BLE SCD in place          NVI, SILT           A/P:     # POD #  1     s/p right Total Knee Arthroplasty                 - OOB to Chair   -PT/OT - wbat  -Pain control - per pain protocol   -Incentive Spirometry   -DVT Prophylaxis - aspirin   -GI ppx- continue Protonix   -f/u am labs   -discharge planning - home with home care

## 2023-01-24 NOTE — DISCHARGE NOTE NURSING/CASE MANAGEMENT/SOCIAL WORK - NSDCPEFALRISK_GEN_ALL_CORE
For information on Fall & Injury Prevention, visit: [email or web address] OR  [email or web address] OR  https://www.cdc.gov/steadi/patient.html

## 2023-01-24 NOTE — CONSULT NOTE ADULT - SUBJECTIVE AND OBJECTIVE BOX
MARY HORNER  57y, Male  Allergy: No Known Allergies      CHIEF COMPLAINT: Total Knee Arthroplasty  (24 Jan 2023 09:01)      HPI:    HPI:    FAMILY HISTORY:  FH: cancer (Father, Mother)      PAST MEDICAL & SURGICAL HISTORY:  Former smoker  10-12 years      Back pain      Anxiety      H/O insomnia      Heart murmur      Obesity (BMI 30-39.9)      Previous back surgery  srews      H/O hand surgery  b/l carpal tunnel surgery          SOCIAL HISTORY  Social History:      Home Medications:  acetaminophen 325 mg oral tablet: 2 tab(s) orally every 6 hours for 14 days post op  (24 Jan 2023 08:47)  ALPRAZolam 1 mg oral tablet: 1 tab(s) orally 2 times a day (23 Jan 2023 08:39)  famotidine 40 mg oral tablet: 1 tab(s) orally once a day (23 Jan 2023 08:39)  gabapentin 400 mg oral capsule: 1 cap(s) orally 3 times a day (23 Jan 2023 08:39)  meloxicam 7.5 mg oral tablet: 1 tab(s) orally 2 times a day (23 Jan 2023 08:39)  Percocet 10/325 oral tablet: 1 tab(s) orally every 6 hours (23 Jan 2023 08:39)  polyethylene glycol 3350 oral powder for reconstitution: 17 gram(s) orally once a day (at bedtime), As Needed (24 Jan 2023 08:49)  senna leaf extract oral tablet: 2 tab(s) orally once a day (at bedtime) (24 Jan 2023 08:49)  zolpidem 10 mg oral tablet: 1 tab(s) orally once a day (at bedtime) (23 Jan 2023 08:39)      ROS  General: Denies fevers, chills, nightsweats, weight loss  HEENT: Denies headache, rhinorrhea, sore throat, eye pain  CV: Denies CP, palpitations  PULM: Denies SOB, cough  GI: Denies abdominal pain, diarrhea  : Denies dysuria, hematuria  MSK: Denies arthralgias  SKIN: Denies rash   NEURO: Denies paresthesias, weakness  PSYCH: Denies depression    VITALS:  T(F): 97.4, Max: 98.3 (01-23-23 @ 12:09)  HR: 70  BP: 129/64  RR: 16Vital Signs Last 24 Hrs  T(C): 36.3 (24 Jan 2023 00:50), Max: 36.8 (23 Jan 2023 12:09)  T(F): 97.4 (24 Jan 2023 00:50), Max: 98.3 (23 Jan 2023 12:09)  HR: 70 (24 Jan 2023 00:50) (53 - 70)  BP: 129/64 (24 Jan 2023 00:50) (119/57 - 157/72)  BP(mean): --  RR: 16 (24 Jan 2023 00:50) (12 - 19)  SpO2: 98% (24 Jan 2023 00:50) (97% - 100%)    Parameters below as of 24 Jan 2023 00:50  Patient On (Oxygen Delivery Method): room air        PHYSICAL EXAM:  Gen: NAD, resting in bed  HEENT: Normocephalic, atraumatic  Neck: supple, no lymphadenopathy  CV: Regular rate & regular rhythm  Lungs: CTABL no wheeze  Abdomen: Soft, NTND+ BS present  Ext: Warm, well perfused no CCE  Neuro: non focal, awake, CN II-XII intact   Skin: no rash, no erythema  Psych: no SI, HI, Hallucination     TESTS & MEASUREMENTS:                        12.9   13.31 )-----------( 205      ( 24 Jan 2023 07:08 )             38.0     01-24    137  |  101  |  19  ----------------------------<  123<H>  4.1   |  25  |  0.8    Ca    8.6      24 Jan 2023 07:08                  QRS axis to [] ° and NSR at a rate of [] BPM. There was no atrial enlargement. There was no ventricular hypertrophy. There were no ST-T changes and all intervals were normal.      INFECTIOUS DISEASES TESTING  MRSA PCR Result.: Negative (01-03-23 @ 07:21)      RADIOLOGY & ADDITIONAL TESTS:  I have personally reviewed the last Chest xray  CXR      CT      CARDIOLOGY TESTING      MEDICATIONS  (STANDING):  acetaminophen     Tablet .. 650 milliGRAM(s) Oral every 6 hours  ALPRAZolam 1 milliGRAM(s) Oral two times a day  aspirin enteric coated 81 milliGRAM(s) Oral two times a day  celecoxib 200 milliGRAM(s) Oral every 12 hours  chlorhexidine 2% Cloths 1 Application(s) Topical <User Schedule>  famotidine    Tablet 40 milliGRAM(s) Oral daily  gabapentin 400 milliGRAM(s) Oral three times a day  influenza   Vaccine 0.5 milliLiter(s) IntraMuscular once  multivitamin 1 Tablet(s) Oral daily  polyethylene glycol 3350 17 Gram(s) Oral at bedtime  senna 2 Tablet(s) Oral at bedtime  sodium chloride 0.9%. 1000 milliLiter(s) (75 mL/Hr) IV Continuous <Continuous>  tetracaine 0.5% Solution 1 Drop(s) Left EYE once    MEDICATIONS  (PRN):  magnesium hydroxide Suspension 30 milliLiter(s) Oral daily PRN Constipation  ondansetron Injectable 4 milliGRAM(s) IV Push every 6 hours PRN Nausea and/or Vomiting  oxyCODONE    IR 5 milliGRAM(s) Oral every 8 hours PRN breakthrough pain  traMADol 50 milliGRAM(s) Oral every 4 hours PRN Severe Pain (7 - 10)  zolpidem 5 milliGRAM(s) Oral at bedtime PRN Insomnia  zolpidem 5 milliGRAM(s) Oral at bedtime PRN Insomnia      ANTIBIOTICS:      All available historical data has been reviewed    ASSESSMENT  57y M admitted with  Right total knee replacement 23-Jan-2023 12:17:09  Zoila Aj.      Operative Findings:  · Operative Findings	degenerative changes    Specimens/Blood Loss/IV/Output/Protocol/VTE:   Specimens/Blood Loss/IV/Output/Protocol/VTE:  · Specimens	bone cuts  · Estimated Blood Loss	100 milliLiter(s)  · Antibiotic Protocol	Followed protocol  · Venous Thromboembolism Prophylaxis Therapy	ICD      POD#1  pain control   DVT prophyaxsis - asa q12   PT/OT  IS  Bowel Regimen  Ortho follow up      MARY HORNER  57y, Male  Allergy: No Known Allergies      CHIEF COMPLAINT: Total Knee Arthroplasty  (24 Jan 2023 09:01)      HPI:    HPI:    FAMILY HISTORY:  FH: cancer (Father, Mother)      PAST MEDICAL & SURGICAL HISTORY:  Former smoker  10-12 years      Back pain      Anxiety      H/O insomnia      Heart murmur      Obesity (BMI 30-39.9)      Previous back surgery  srews      H/O hand surgery  b/l carpal tunnel surgery          SOCIAL HISTORY  Social History:      Home Medications:  acetaminophen 325 mg oral tablet: 2 tab(s) orally every 6 hours for 14 days post op  (24 Jan 2023 08:47)  ALPRAZolam 1 mg oral tablet: 1 tab(s) orally 2 times a day (23 Jan 2023 08:39)  famotidine 40 mg oral tablet: 1 tab(s) orally once a day (23 Jan 2023 08:39)  gabapentin 400 mg oral capsule: 1 cap(s) orally 3 times a day (23 Jan 2023 08:39)  meloxicam 7.5 mg oral tablet: 1 tab(s) orally 2 times a day (23 Jan 2023 08:39)  Percocet 10/325 oral tablet: 1 tab(s) orally every 6 hours (23 Jan 2023 08:39)  polyethylene glycol 3350 oral powder for reconstitution: 17 gram(s) orally once a day (at bedtime), As Needed (24 Jan 2023 08:49)  senna leaf extract oral tablet: 2 tab(s) orally once a day (at bedtime) (24 Jan 2023 08:49)  zolpidem 10 mg oral tablet: 1 tab(s) orally once a day (at bedtime) (23 Jan 2023 08:39)      ROS  General: Denies fevers, chills, nightsweats, weight loss  HEENT: Denies headache, rhinorrhea, sore throat, eye pain  CV: Denies CP, palpitations  PULM: Denies SOB, cough  GI: Denies abdominal pain, diarrhea  : Denies dysuria, hematuria  MSK: Denies arthralgias  SKIN: Denies rash   NEURO: Denies paresthesias, weakness  PSYCH: Denies depression    VITALS:  T(F): 97.4, Max: 98.3 (01-23-23 @ 12:09)  HR: 70  BP: 129/64  RR: 16Vital Signs Last 24 Hrs  T(C): 36.3 (24 Jan 2023 00:50), Max: 36.8 (23 Jan 2023 12:09)  T(F): 97.4 (24 Jan 2023 00:50), Max: 98.3 (23 Jan 2023 12:09)  HR: 70 (24 Jan 2023 00:50) (53 - 70)  BP: 129/64 (24 Jan 2023 00:50) (119/57 - 157/72)  BP(mean): --  RR: 16 (24 Jan 2023 00:50) (12 - 19)  SpO2: 98% (24 Jan 2023 00:50) (97% - 100%)    Parameters below as of 24 Jan 2023 00:50  Patient On (Oxygen Delivery Method): room air        PHYSICAL EXAM:  Gen: NAD, resting in bed  HEENT: Normocephalic, atraumatic  Neck: supple, no lymphadenopathy  CV: Regular rate & regular rhythm  Lungs: CTABL no wheeze  Abdomen: Soft, NTND+ BS present  Ext: Warm, well perfused no CCE right knee dressing intact   Neuro: non focal, awake, CN II-XII intact   Skin: no rash, no erythema  Psych: no SI, HI, Hallucination     TESTS & MEASUREMENTS:                        12.9   13.31 )-----------( 205      ( 24 Jan 2023 07:08 )             38.0     01-24    137  |  101  |  19  ----------------------------<  123<H>  4.1   |  25  |  0.8    Ca    8.6      24 Jan 2023 07:08                  QRS axis to [] ° and NSR at a rate of [] BPM. There was no atrial enlargement. There was no ventricular hypertrophy. There were no ST-T changes and all intervals were normal.      INFECTIOUS DISEASES TESTING  MRSA PCR Result.: Negative (01-03-23 @ 07:21)      RADIOLOGY & ADDITIONAL TESTS:  I have personally reviewed the last Chest xray  CXR      CT      CARDIOLOGY TESTING      MEDICATIONS  (STANDING):  acetaminophen     Tablet .. 650 milliGRAM(s) Oral every 6 hours  ALPRAZolam 1 milliGRAM(s) Oral two times a day  aspirin enteric coated 81 milliGRAM(s) Oral two times a day  celecoxib 200 milliGRAM(s) Oral every 12 hours  chlorhexidine 2% Cloths 1 Application(s) Topical <User Schedule>  famotidine    Tablet 40 milliGRAM(s) Oral daily  gabapentin 400 milliGRAM(s) Oral three times a day  influenza   Vaccine 0.5 milliLiter(s) IntraMuscular once  multivitamin 1 Tablet(s) Oral daily  polyethylene glycol 3350 17 Gram(s) Oral at bedtime  senna 2 Tablet(s) Oral at bedtime  sodium chloride 0.9%. 1000 milliLiter(s) (75 mL/Hr) IV Continuous <Continuous>  tetracaine 0.5% Solution 1 Drop(s) Left EYE once    MEDICATIONS  (PRN):  magnesium hydroxide Suspension 30 milliLiter(s) Oral daily PRN Constipation  ondansetron Injectable 4 milliGRAM(s) IV Push every 6 hours PRN Nausea and/or Vomiting  oxyCODONE    IR 5 milliGRAM(s) Oral every 8 hours PRN breakthrough pain  traMADol 50 milliGRAM(s) Oral every 4 hours PRN Severe Pain (7 - 10)  zolpidem 5 milliGRAM(s) Oral at bedtime PRN Insomnia  zolpidem 5 milliGRAM(s) Oral at bedtime PRN Insomnia      ANTIBIOTICS:      All available historical data has been reviewed    ASSESSMENT  57y M admitted with  Right total knee replacement 23-Jan-2023 12:17:09  Zoila Aj.      Operative Findings:  · Operative Findings	degenerative changes    Specimens/Blood Loss/IV/Output/Protocol/VTE:   Specimens/Blood Loss/IV/Output/Protocol/VTE:  · Specimens	bone cuts  · Estimated Blood Loss	100 milliLiter(s)  · Antibiotic Protocol	Followed protocol  · Venous Thromboembolism Prophylaxis Therapy	ICD      POD#1  pain control - pt also has hx of chronic back pain sp fusion - follows pain mngmt outpt   DVT prophyaxsis - asa q12   PT/OT  IS  Bowel Regimen  Ortho follow up     recall prn

## 2023-01-24 NOTE — DISCHARGE NOTE PROVIDER - HOSPITAL COURSE
57 year old male with a past medical history of anxiety, was admitted for an elective Total Knee Arthroplasty. The patient tolerated surgery well with no intra/post operative complications. He was given intra/post operative antibiotics for infection prophylaxis and will be discharged on Aspirin 81mg BID for 30 days to lower the risk of blood clots. He worked with Physical Therapy while admitted to the hospital and is stable for discharge.

## 2023-01-24 NOTE — OCCUPATIONAL THERAPY INITIAL EVALUATION ADULT - TRANSFER SAFETY CONCERNS NOTED: TUB, REHAB EVAL
As discussed with pt, pt to have wife present during tub transfer to increase safety. Pt demonstrated good understanding and in agreement./decreased weight-shifting ability

## 2023-01-24 NOTE — OCCUPATIONAL THERAPY INITIAL EVALUATION ADULT - ADDITIONAL COMMENTS
PLOF obtained from pt. Pt reported that he owns grab bars in tub and shower chair. RW and 3:1 commode delivered.

## 2023-01-24 NOTE — DISCHARGE NOTE PROVIDER - NSDCMRMEDTOKEN_GEN_ALL_CORE_FT
acetaminophen 325 mg oral tablet: 2 tab(s) orally every 6 hours for 14 days post op   ALPRAZolam 1 mg oral tablet: 1 tab(s) orally 2 times a day  aspirin 81 mg oral delayed release tablet: 1 tab(s) orally 2 times a day  famotidine 40 mg oral tablet: 1 tab(s) orally once a day  gabapentin 400 mg oral capsule: 1 cap(s) orally 3 times a day  meloxicam 7.5 mg oral tablet: 1 tab(s) orally 2 times a day  Percocet 10/325 oral tablet: 1 tab(s) orally every 6 hours  polyethylene glycol 3350 oral powder for reconstitution: 17 gram(s) orally once a day (at bedtime), As Needed  senna leaf extract oral tablet: 2 tab(s) orally once a day (at bedtime)  zolpidem 10 mg oral tablet: 1 tab(s) orally once a day (at bedtime)

## 2023-01-24 NOTE — DISCHARGE NOTE PROVIDER - CARE PROVIDER_API CALL
Virgilio Kimbrough)  Orthopaedic Surgery  [address] [place] [address]  Phone: [phone]  Fax: [phone]  Follow Up Time:

## 2023-01-24 NOTE — OCCUPATIONAL THERAPY INITIAL EVALUATION ADULT - GENERAL OBSERVATIONS, REHAB EVAL
11:01-11:20; chart reviewed, ok to treat by Occupational Therapist as confirmed by SRAVANI Leiva, Pt received semifowler +aquacel right knee +LUE heplock in NAD. Pt reported 9/10 right knee pain; RN aware. Pt in agreement with OT IE.

## 2023-01-24 NOTE — DISCHARGE NOTE PROVIDER - NSDCFUSCHEDAPPT_GEN_ALL_CORE_FT
Virgilio Sarabia  [facility]  ONCORTH[facility] [address]  Scheduled Appointment: 02/16/2023    Thelma Reid  [facility]  ON[address]PAINMGT [address]  Scheduled Appointment: 02/21/2023    Varsha Otero  [facility]  [address]IN[address]GT [address]  Scheduled Appointment: 02/22/2023

## 2023-01-24 NOTE — OCCUPATIONAL THERAPY INITIAL EVALUATION ADULT - STANDING BALANCE: DYNAMIC, REHAB EVAL
Mid-Level Procedure Text (A): After obtaining clear surgical margins the patient was sent to a mid-level provider for surgical repair.  The patient understands they will receive post-surgical care and follow-up from the mid-level provider. with RW/fair balance

## 2023-01-27 DIAGNOSIS — F41.9 ANXIETY DISORDER, UNSPECIFIED: ICD-10-CM

## 2023-01-27 DIAGNOSIS — G47.00 INSOMNIA, UNSPECIFIED: ICD-10-CM

## 2023-01-27 DIAGNOSIS — E66.9 OBESITY, UNSPECIFIED: ICD-10-CM

## 2023-01-27 DIAGNOSIS — G89.29 OTHER CHRONIC PAIN: ICD-10-CM

## 2023-01-27 DIAGNOSIS — Z98.1 ARTHRODESIS STATUS: ICD-10-CM

## 2023-01-27 DIAGNOSIS — Z79.1 LONG TERM (CURRENT) USE OF NON-STEROIDAL ANTI-INFLAMMATORIES (NSAID): ICD-10-CM

## 2023-01-27 DIAGNOSIS — M17.12 UNILATERAL PRIMARY OSTEOARTHRITIS, LEFT KNEE: ICD-10-CM

## 2023-01-27 DIAGNOSIS — Z87.891 PERSONAL HISTORY OF NICOTINE DEPENDENCE: ICD-10-CM

## 2023-01-27 LAB — SURGICAL PATHOLOGY STUDY: SIGNIFICANT CHANGE UP

## 2023-02-02 RX ORDER — OXYCODONE AND ACETAMINOPHEN 10; 325 MG/1; MG/1
10-325 TABLET ORAL EVERY 8 HOURS
Qty: 42 | Refills: 0 | Status: ACTIVE | COMMUNITY
Start: 2023-02-02 | End: 1900-01-01

## 2023-02-06 RX ORDER — OXYCODONE AND ACETAMINOPHEN 5; 325 MG/1; MG/1
5-325 TABLET ORAL
Qty: 56 | Refills: 0 | Status: ACTIVE | COMMUNITY
Start: 2023-02-06 | End: 1900-01-01

## 2023-02-06 RX ORDER — TRAMADOL HYDROCHLORIDE 50 MG/1
50 TABLET, COATED ORAL
Qty: 28 | Refills: 0 | Status: ACTIVE | COMMUNITY
Start: 2023-02-06 | End: 1900-01-01

## 2023-02-08 ENCOUNTER — RX RENEWAL (OUTPATIENT)
Age: 58
End: 2023-02-08

## 2023-02-12 ENCOUNTER — LABORATORY RESULT (OUTPATIENT)
Age: 58
End: 2023-02-12

## 2023-02-13 ENCOUNTER — APPOINTMENT (OUTPATIENT)
Dept: PAIN MANAGEMENT | Facility: CLINIC | Age: 58
End: 2023-02-13
Payer: MEDICARE

## 2023-02-13 VITALS
WEIGHT: 232 LBS | BODY MASS INDEX: 36.41 KG/M2 | HEIGHT: 67 IN | SYSTOLIC BLOOD PRESSURE: 140 MMHG | DIASTOLIC BLOOD PRESSURE: 92 MMHG | HEART RATE: 68 BPM

## 2023-02-13 PROBLEM — Z87.898 PERSONAL HISTORY OF OTHER SPECIFIED CONDITIONS: Chronic | Status: ACTIVE | Noted: 2023-01-03

## 2023-02-13 PROBLEM — E66.9 OBESITY, UNSPECIFIED: Chronic | Status: ACTIVE | Noted: 2023-01-23

## 2023-02-13 PROBLEM — F41.9 ANXIETY DISORDER, UNSPECIFIED: Chronic | Status: ACTIVE | Noted: 2023-01-03

## 2023-02-13 PROBLEM — Z87.891 PERSONAL HISTORY OF NICOTINE DEPENDENCE: Chronic | Status: ACTIVE | Noted: 2023-01-03

## 2023-02-13 PROBLEM — R01.1 CARDIAC MURMUR, UNSPECIFIED: Chronic | Status: ACTIVE | Noted: 2023-01-23

## 2023-02-13 PROBLEM — M54.9 DORSALGIA, UNSPECIFIED: Chronic | Status: ACTIVE | Noted: 2023-01-03

## 2023-02-13 LAB
AMP / AMPHETAMINE: NEGATIVE
BAR / SECOBARBITAL: NEGATIVE
BUP / BUPRENORPHINE: NEGATIVE
BZO / OXAZEPAM: POSITIVE
COC / COCAINE: NEGATIVE
CREATININE: NORMAL MG/DL
MDMA / METHYLENEDIOXYMETHAMPHETAMINE: NEGATIVE
MET / METHAMPHETAMINES: NEGATIVE
MOP / MORPHINE: NEGATIVE
MTD / METHADONE: NEGATIVE
OXY / OXYCODONE: POSITIVE
PCP / PHENCYCLIDINE: NEGATIVE
PH: 7
SPECIFIC GRAVITY: 1
TEMPERATURE: 94 C
THC / MARIJUANA: NEGATIVE

## 2023-02-13 PROCEDURE — 80305 DRUG TEST PRSMV DIR OPT OBS: CPT | Mod: QW

## 2023-02-13 PROCEDURE — 99212 OFFICE O/P EST SF 10 MIN: CPT

## 2023-02-13 RX ORDER — GABAPENTIN 400 MG/1
400 CAPSULE ORAL 3 TIMES DAILY
Qty: 90 | Refills: 5 | Status: ACTIVE | COMMUNITY
Start: 2022-07-20 | End: 1900-01-01

## 2023-02-13 RX ORDER — MELOXICAM 15 MG/1
15 TABLET ORAL
Qty: 30 | Refills: 3 | Status: DISCONTINUED | COMMUNITY
Start: 2022-08-17 | End: 2023-02-13

## 2023-02-13 NOTE — PHYSICAL EXAM
[Normal Mood and Affect] : normal mood and affect [Orientated] : orientated [Able to Communicate] : able to communicate [Well Developed] : well developed [Well Nourished] : well nourished [Flexion] : flexion [] : positive sitting straight leg raise [TWNoteComboBox7] : forward flexion 30 degrees [de-identified] : extension 30 degrees [de-identified] : left lateral flexion 20 degrees [de-identified] : left lateral rotation 45 degrees [de-identified] : right lateral flexion 20 degrees [TWNoteComboBox6] : right lateral rotation 45 degrees

## 2023-02-13 NOTE — HISTORY OF PRESENT ILLNESS
[FreeTextEntry1] : HISTORY OF PRESENT ILLNESS, ORIGINAL PRESENTATION: This is a 57 year old man complaining of lower back pain. Patient underwent a triple fusion, it helped for the 1st year and then his pain came back. He started exercising however he made his pain worse. The patient has had this pain for 6 years. The pain started after injury at work, however that case has since closed. Patient describes pain as severe. During the last month the pain has been in no typical pattern. Pain described as sharp, pressure-like, shooting. Pain is associated with numbness and pins and needles into the lower extremity. Pain is increased with standing, walking, exercise, coughing/sneezing. Pain is decreased with lying down, relaxation. Pain is not changed with sitting, bowel movements. Bowel or bladder habits have not changed. \par \par PRIOR PAIN TREATMENTS: Moderate relief from surgery. No relief from injections, physical therapy, exercise, TENS unit, heat/cold treatment\par \par TODAY: He presents for a revisit appointment. Last seen on 12/20/2022 and today reports that since then he underwent Right Knee TJR with Dr. Gaona on 01/23/2023 and is about to start PT. He uses cane for ambulation.\par \par He continues to remain stable on a regimen of Mobic and  Gabapentin 400 mg. \par Of note: Patient reports today that he is here to refil his medications- Percocet 10/325 TID; however he last refilled this medication on 01/17/2023 but claims that due to the postsurgical pain he run out of it. At the same time he got two prescriptions from Ortho on 02/08/2023: Tramadol 50mg BID for 14 days and Percocet 5/325 QID for 14 days, but also claims that he run out of medications. I had to explain to the patient that this is unacceptable. This patient is also on Medical Marijuana by another provider,but claims that he has no idea what I am talking about and those prescriptions/fills recorded in  are not his. Supervising Physician is made aware of the current situation. I will NOT send any medications today until next visit.\par UDS, repeated today  and will be reviewed later..

## 2023-02-13 NOTE — DISCUSSION/SUMMARY
[Medication Risks Reviewed] : Medication risks reviewed [de-identified] : I have consulted the  registry for the purpose of reviewing the patient's controlled substance.\par \par Overall there is at least a 30-50% reduction in pain with the prescribed analgesics. The patient denies any adverse side effects due to the medication (sleeping disturbance, constipation, sleepiness, hallucinations and/or urination problems). \par \par Urine drug screening will collected today.\par \par The patient will return to the office in 4 weeks time and is aware to contact me with any question or concerns in the interim.\par \par Moe Schafer PA-C\par Nehemiah Danielson DO\par

## 2023-02-13 NOTE — ASSESSMENT
[FreeTextEntry1] : 57 year old male with lumbar pain with radiculopathy. He is s/p Right knee TJR on 01/23/23.He is to start PT as per Ortho. I will send his Gabapentin and Meloxicam today. No controlled substances/pain medications refilled today.\par UDS will be repeated today.\par patient will be re-evaluated on the next visit.

## 2023-02-14 ENCOUNTER — APPOINTMENT (OUTPATIENT)
Dept: PAIN MANAGEMENT | Facility: CLINIC | Age: 58
End: 2023-02-14

## 2023-02-16 ENCOUNTER — APPOINTMENT (OUTPATIENT)
Dept: ORTHOPEDIC SURGERY | Facility: CLINIC | Age: 58
End: 2023-02-16
Payer: MEDICARE

## 2023-02-16 ENCOUNTER — RESULT CHARGE (OUTPATIENT)
Age: 58
End: 2023-02-16

## 2023-02-16 DIAGNOSIS — M79.661 PAIN IN RIGHT LOWER LEG: ICD-10-CM

## 2023-02-16 PROCEDURE — 99024 POSTOP FOLLOW-UP VISIT: CPT

## 2023-02-16 PROCEDURE — 73560 X-RAY EXAM OF KNEE 1 OR 2: CPT | Mod: 50

## 2023-02-16 NOTE — HISTORY OF PRESENT ILLNESS
[de-identified] : Patient is a 57-year-old male status post right total knee replacement 1/23/2023 Dr. Sarabia.  Patient is overall doing well at this time, complains of mild discomfort to right calf.  Denies numbness/tingling, denies instability.  Patient's pain is controlled other than having calf pain.

## 2023-02-16 NOTE — DATA REVIEWED
[FreeTextEntry1] : Bilateral knee x-rays for comparison: No acute fractures, subluxations, dislocations.  Right total knee replacement, surgical hardware intact in good position.

## 2023-02-16 NOTE — DISCUSSION/SUMMARY
[de-identified] : Discussed with patient that overall he is doing well.  Mild suspicion for right lower extremity DVT.  Patient sent stat for lower extremity venous duplex.  Advised patient if symptoms worsen or having any shortness of breath to go to emergency room.  Call after imaging to discuss results.  Call with any questions or concerns.  Patient will follow-up in 6 weeks for reevaluation.  Patient understands agrees with plan.  Seen at the supervision of Dr. Sarabia.

## 2023-02-16 NOTE — PHYSICAL EXAM
[Right] : right knee [4___] : hamstring 4[unfilled]/5 [] : no extensor lag [TWNoteComboBox7] : flexion 110 degrees [de-identified] : extension 0 degrees

## 2023-02-17 ENCOUNTER — RESULT REVIEW (OUTPATIENT)
Age: 58
End: 2023-02-17

## 2023-02-17 ENCOUNTER — OUTPATIENT (OUTPATIENT)
Dept: OUTPATIENT SERVICES | Facility: HOSPITAL | Age: 58
LOS: 1 days | End: 2023-02-17
Payer: MEDICARE

## 2023-02-17 DIAGNOSIS — M79.661 PAIN IN RIGHT LOWER LEG: ICD-10-CM

## 2023-02-17 DIAGNOSIS — Z98.890 OTHER SPECIFIED POSTPROCEDURAL STATES: Chronic | ICD-10-CM

## 2023-02-17 PROCEDURE — 93971 EXTREMITY STUDY: CPT | Mod: RT

## 2023-02-17 PROCEDURE — 93971 EXTREMITY STUDY: CPT | Mod: 26,RT

## 2023-02-18 DIAGNOSIS — M79.661 PAIN IN RIGHT LOWER LEG: ICD-10-CM

## 2023-03-06 ENCOUNTER — APPOINTMENT (OUTPATIENT)
Dept: PAIN MANAGEMENT | Facility: CLINIC | Age: 58
End: 2023-03-06
Payer: MEDICARE

## 2023-03-06 ENCOUNTER — FORM ENCOUNTER (OUTPATIENT)
Age: 58
End: 2023-03-06

## 2023-03-06 VITALS
HEIGHT: 67 IN | BODY MASS INDEX: 36.41 KG/M2 | HEART RATE: 68 BPM | SYSTOLIC BLOOD PRESSURE: 120 MMHG | DIASTOLIC BLOOD PRESSURE: 80 MMHG | WEIGHT: 232 LBS

## 2023-03-06 DIAGNOSIS — M54.50 LOW BACK PAIN, UNSPECIFIED: ICD-10-CM

## 2023-03-06 DIAGNOSIS — M25.561 PAIN IN RIGHT KNEE: ICD-10-CM

## 2023-03-06 DIAGNOSIS — M54.12 RADICULOPATHY, CERVICAL REGION: ICD-10-CM

## 2023-03-06 DIAGNOSIS — M25.551 PAIN IN RIGHT HIP: ICD-10-CM

## 2023-03-06 DIAGNOSIS — Z79.899 OTHER LONG TERM (CURRENT) DRUG THERAPY: ICD-10-CM

## 2023-03-06 DIAGNOSIS — M54.16 RADICULOPATHY, LUMBAR REGION: ICD-10-CM

## 2023-03-06 PROCEDURE — 99213 OFFICE O/P EST LOW 20 MIN: CPT

## 2023-03-06 RX ORDER — MELOXICAM 7.5 MG/1
7.5 TABLET ORAL
Qty: 60 | Refills: 0 | Status: ACTIVE | COMMUNITY
Start: 2022-10-20 | End: 1900-01-01

## 2023-03-06 NOTE — HISTORY OF PRESENT ILLNESS
[FreeTextEntry1] : HISTORY OF PRESENT ILLNESS, ORIGINAL PRESENTATION: This is a 57 year old man complaining of lower back pain. Patient underwent a triple fusion, it helped for the 1st year and then his pain came back. He started exercising however he made his pain worse. The patient has had this pain for 6 years. The pain started after injury at work, however that case has since closed. Patient describes pain as severe. During the last month the pain has been in no typical pattern. Pain described as sharp, pressure-like, shooting. Pain is associated with numbness and pins and needles into the lower extremity. Pain is increased with standing, walking, exercise, coughing/sneezing. Pain is decreased with lying down, relaxation. Pain is not changed with sitting, bowel movements. Bowel or bladder habits have not changed. \par \par PRIOR PAIN TREATMENTS: Moderate relief from surgery. No relief from injections, physical therapy, exercise, TENS unit, heat/cold treatment\par \par TODAY: He presents for a revisit appointment. Last seen on 2/16/2024. He recently underwent Right Knee TJR with Dr. Gaona on 01/23/2023. Post-op this patient received two prescriptions from Ortho on 02/08/2023: Tramadol 50mg BID for 14 days and Percocet 5/325 QID for 14 days, but claimed that he ran out of medications. This patient is also on Medical Marijuana by another provider, however, claims his medical marijuana card was stolen and that someone is using it under his name. Supervising Physician was made aware of the situation and advised that we are no longer prescribing this patient narcotics. \par \par UDS on 2/13/23 - consistent

## 2023-03-06 NOTE — PHYSICAL EXAM
[Normal Mood and Affect] : normal mood and affect [Orientated] : orientated [Able to Communicate] : able to communicate [Well Developed] : well developed [Well Nourished] : well nourished [Flexion] : flexion [] : positive sitting straight leg raise [TWNoteComboBox7] : forward flexion 30 degrees [de-identified] : extension 30 degrees [de-identified] : left lateral flexion 20 degrees [de-identified] : left lateral rotation 45 degrees [de-identified] : right lateral flexion 20 degrees [TWNoteComboBox6] : right lateral rotation 45 degrees

## 2023-03-06 NOTE — ASSESSMENT
[FreeTextEntry1] : 57 year old male with lumbar pain with radiculopathy. Narcotics will not be filled due to patient non-compliance with medication policies.  F/u in 4 weeks for further evaluation.\par \par I have consulted the  registry for the purpose of reviewing the patient's controlled substance.\par \par Mario Morales PA-C\par Nehemiah Danielson D.O\par

## 2023-03-07 ENCOUNTER — NON-APPOINTMENT (OUTPATIENT)
Age: 58
End: 2023-03-07

## 2023-03-25 ENCOUNTER — RX RENEWAL (OUTPATIENT)
Age: 58
End: 2023-03-25

## 2023-04-03 ENCOUNTER — APPOINTMENT (OUTPATIENT)
Dept: PAIN MANAGEMENT | Facility: CLINIC | Age: 58
End: 2023-04-03

## 2023-04-04 ENCOUNTER — APPOINTMENT (OUTPATIENT)
Dept: ORTHOPEDIC SURGERY | Facility: CLINIC | Age: 58
End: 2023-04-04
Payer: MEDICARE

## 2023-04-04 DIAGNOSIS — Z96.651 PRESENCE OF RIGHT ARTIFICIAL KNEE JOINT: ICD-10-CM

## 2023-04-04 PROCEDURE — 99024 POSTOP FOLLOW-UP VISIT: CPT

## 2023-04-04 NOTE — DISCUSSION/SUMMARY
[de-identified] : Patient overall doing well at this time, no complaints.  Advised patient to continue at home physical therapy exercises.  Patient will follow-up in 3 months for reevaluation.  Call with any questions or concerns.  Patient understands agrees with plan seen under supervision of Dr. Sarabia.

## 2023-04-04 NOTE — HISTORY OF PRESENT ILLNESS
[de-identified] : Patient is a 57-year-old male status post right total knee 1/23/2023 with Dr. Sarabia.  Patient is doing well, no complaints at this time.

## 2023-04-04 NOTE — PHYSICAL EXAM
[Right] : right knee [4___] : hamstring 4[unfilled]/5 [] : light touch is intact throughout [TWNoteComboBox7] : flexion 135 degrees [de-identified] : extension 0 degrees

## 2023-05-04 ENCOUNTER — APPOINTMENT (OUTPATIENT)
Dept: PAIN MANAGEMENT | Facility: CLINIC | Age: 58
End: 2023-05-04

## 2023-07-11 ENCOUNTER — APPOINTMENT (OUTPATIENT)
Dept: ORTHOPEDIC SURGERY | Facility: CLINIC | Age: 58
End: 2023-07-11
Payer: MEDICARE

## 2023-07-11 PROCEDURE — 73560 X-RAY EXAM OF KNEE 1 OR 2: CPT | Mod: 50

## 2023-07-11 PROCEDURE — 99213 OFFICE O/P EST LOW 20 MIN: CPT

## 2023-07-12 NOTE — HISTORY OF PRESENT ILLNESS
[de-identified] : f/u of right knee replacement\par doing well\par did have a fall but no lasting effects from that\par incision well healed\par ROM 0-120\par knee stable\par \par Imaging:\par X-rays were reviewed with the patient.  \par AP and Lateral views were obtained of the knees, including the contralateral side for comparison purposes.\par X-rays are negative for acute bone or soft tissue trauma.\par right tka in good position\par \par \par plan:\par activity as tolerated\par f/u 1 year

## 2024-05-28 NOTE — OCCUPATIONAL THERAPY INITIAL EVALUATION ADULT - WEIGHT-BEARING RESTRICTIONS: TUB, REHAB EVAL
"Subjective   Phil Dickey is a 69 y.o. male who presents for follow-up of Type 2 diabetes mellitus. The initial diagnosis of diabetes was made in July 2019 . The patient was last seen in April 2022.      He bought a condo in [place] which he is currently renovating.  He goes back and forth to the [place] where he also resides.        Known complications due to diabetes included peripheral vascular disease    Cardiovascular risk factors include advanced age (older than 55 for men, 65 for women), diabetes mellitus, and male gender. The patient is not on an ACE inhibitor or angiotensin II receptor blocker.  The patient has not been previously hospitalized due to diabetic ketoacidosis.     Current symptoms/problems include none. His clinical course has been stable.     The patient is not currently checking the blood glucose.    Patient is using: no devices     Hypoglycemia frequency: Denies   Hypoglycemia awareness: N/A      Current Medication Regimen  Glimepiride 4mg twice daily  Alogliptin 25mg one daily   Farxiga 10mg once daily     MEALS:   Breakfast 9am - eggs, sausage, pancakes, bran cereal with strawberries  Lunch - omits   Dinner - frozen meals, goes out as he does not have a kitchen  Snacks - popcorn  Beverages - water, coke zero, occasional tea      Exercise regimen  - denies as he was remodeling      Quit tobacco use in 1995     Review of Systems   Eyes:         Dry eye    Musculoskeletal:  Negative for back pain.   Skin:         Dry hands        Objective   /68 (BP Location: Left arm, Patient Position: Sitting, BP Cuff Size: Adult)   Pulse 61   Ht 1.803 m (5' 11\")   Wt 86.2 kg (190 lb)   BMI 26.50 kg/m²   Physical Exam  Vitals and nursing note reviewed.   Constitutional:       General: He is not in acute distress.     Appearance: Normal appearance. He is normal weight.   HENT:      Head: Normocephalic and atraumatic.      Nose: Nose normal.      Mouth/Throat:      Mouth: Mucous " "membranes are moist.   Eyes:      Extraocular Movements: Extraocular movements intact.   Cardiovascular:      Rate and Rhythm: Normal rate and regular rhythm.   Pulmonary:      Effort: Pulmonary effort is normal.      Breath sounds: Normal breath sounds.   Musculoskeletal:         General: Normal range of motion.   Skin:     General: Skin is warm.   Neurological:      Mental Status: He is alert and oriented to person, place, and time.   Psychiatric:         Mood and Affect: Mood normal.       Lab Review  Glucose (mg/dL)   Date Value   05/19/2023 110 (H)   10/06/2021 144 (H)   07/13/2021 136 (H)     Hemoglobin A1C (%)   Date Value   05/19/2023 6.4 (A)   10/06/2021 7.5 (A)   07/13/2021 7.4     Bicarbonate (mmol/L)   Date Value   05/19/2023 26   10/06/2021 27   07/13/2021 28     Urea Nitrogen (mg/dL)   Date Value   05/19/2023 21   10/06/2021 20   07/13/2021 20     Creatinine (mg/dL)   Date Value   05/19/2023 0.98   10/06/2021 1.10   07/13/2021 0.97     Lab Results   Component Value Date    CHOL 119 05/19/2023    CHOL 109 10/06/2021    CHOL 174 07/13/2021     Lab Results   Component Value Date    HDL 48.4 05/19/2023    HDL 39.8 (A) 10/06/2021    HDL 40.1 07/13/2021     No results found for: \"LDLCALC\"  Lab Results   Component Value Date    TRIG 48 05/19/2023    TRIG 72 10/06/2021    TRIG 112 07/13/2021     Health Maintenance:   Foot Exam: October 2021  Eye Exam:  May 28, 2024  Urine Albumin:  May 19, 2023    Assessment/Plan   69-year-old male presents for follow up for type 2 diabetes. His blood pressure is at goal. He is noted to be on a statin    Diabetes mellitus, type 2 (Multi)  To continue Alogliptin 25mg once daily  To continue Glimepiride 4mg twice daily before breakfast and dinner   To continue Farxiga 10mg once daily   Please check blood sugars once daily and keep a detailed log  [facility]       Hyperlipidemia  To continue Atorvastatin 20mg once daily     For follow up in " 12 months     RLE/weight-bearing as tolerated
